# Patient Record
Sex: FEMALE | Race: WHITE
[De-identification: names, ages, dates, MRNs, and addresses within clinical notes are randomized per-mention and may not be internally consistent; named-entity substitution may affect disease eponyms.]

---

## 2017-02-14 ENCOUNTER — HOSPITAL ENCOUNTER (OUTPATIENT)
Dept: HOSPITAL 39 - ER | Age: 77
Setting detail: OBSERVATION
LOS: 1 days | Discharge: HOME | End: 2017-02-15
Attending: NURSE PRACTITIONER | Admitting: INTERNAL MEDICINE
Payer: MEDICARE

## 2017-02-14 DIAGNOSIS — Z90.710: ICD-10-CM

## 2017-02-14 DIAGNOSIS — R07.89: Primary | ICD-10-CM

## 2017-02-14 DIAGNOSIS — Z86.73: ICD-10-CM

## 2017-02-14 DIAGNOSIS — R06.02: ICD-10-CM

## 2017-02-14 DIAGNOSIS — K44.9: ICD-10-CM

## 2017-02-14 DIAGNOSIS — Z88.3: ICD-10-CM

## 2017-02-14 DIAGNOSIS — Z80.42: ICD-10-CM

## 2017-02-14 DIAGNOSIS — Z96.652: ICD-10-CM

## 2017-02-14 DIAGNOSIS — Z83.3: ICD-10-CM

## 2017-02-14 DIAGNOSIS — Z98.1: ICD-10-CM

## 2017-02-14 DIAGNOSIS — Z90.49: ICD-10-CM

## 2017-02-14 DIAGNOSIS — K76.0: ICD-10-CM

## 2017-02-14 DIAGNOSIS — K21.9: ICD-10-CM

## 2017-02-14 DIAGNOSIS — E87.6: ICD-10-CM

## 2017-02-14 DIAGNOSIS — Z88.6: ICD-10-CM

## 2017-02-14 DIAGNOSIS — Z81.8: ICD-10-CM

## 2017-02-14 DIAGNOSIS — E53.8: ICD-10-CM

## 2017-02-14 DIAGNOSIS — Z82.3: ICD-10-CM

## 2017-02-14 DIAGNOSIS — Z82.49: ICD-10-CM

## 2017-02-14 DIAGNOSIS — E11.9: ICD-10-CM

## 2017-02-14 DIAGNOSIS — G40.909: ICD-10-CM

## 2017-02-14 PROCEDURE — 82550 ASSAY OF CK (CPK): CPT

## 2017-02-14 PROCEDURE — 80053 COMPREHEN METABOLIC PANEL: CPT

## 2017-02-14 PROCEDURE — 82553 CREATINE MB FRACTION: CPT

## 2017-02-14 PROCEDURE — 80048 BASIC METABOLIC PNL TOTAL CA: CPT

## 2017-02-14 PROCEDURE — 85379 FIBRIN DEGRADATION QUANT: CPT

## 2017-02-14 PROCEDURE — 81001 URINALYSIS AUTO W/SCOPE: CPT

## 2017-02-14 PROCEDURE — 94760 N-INVAS EAR/PLS OXIMETRY 1: CPT

## 2017-02-14 PROCEDURE — 36415 COLL VENOUS BLD VENIPUNCTURE: CPT

## 2017-02-14 PROCEDURE — 93005 ELECTROCARDIOGRAM TRACING: CPT

## 2017-02-14 PROCEDURE — 71010: CPT

## 2017-02-14 PROCEDURE — 99284 EMERGENCY DEPT VISIT MOD MDM: CPT

## 2017-02-14 PROCEDURE — 83880 ASSAY OF NATRIURETIC PEPTIDE: CPT

## 2017-02-14 PROCEDURE — 80061 LIPID PANEL: CPT

## 2017-02-14 PROCEDURE — 84484 ASSAY OF TROPONIN QUANT: CPT

## 2017-02-14 PROCEDURE — 71275 CT ANGIOGRAPHY CHEST: CPT

## 2017-02-14 PROCEDURE — 85025 COMPLETE CBC W/AUTO DIFF WBC: CPT

## 2017-02-14 PROCEDURE — 85730 THROMBOPLASTIN TIME PARTIAL: CPT

## 2017-02-14 PROCEDURE — 85610 PROTHROMBIN TIME: CPT

## 2017-02-14 NOTE — RAD
PROCEDURE: XR CHEST 1 VIEW



HISTORY: pain



COMPARISON:



 9/5/2014



TECHNIQUE: 



Single projection of the chest was done.



FINDINGS:



The lung fields are well inflated .



There are no discrete airspace infiltrates, pneumothoraces or

pleural effusions.



The pulmonary vascularity is normal.



The cardiomediastinal contour is stable .



IMPRESSION: 



There is no acute pleural-parenchymal process seen in the imaged

lung fields.



Location of Interpretation: [facility]



Electronically signed by:  Dane Kearns MD  2/14/2017 11:38 PM

CST

## 2017-02-14 NOTE — ED.PDOC
History of Present Illness





- General


Stated Complaint: shortness of breath, chest pain


Time Seen by Provider: 17 23:12


Source: patient, RN notes reviewed, Vital Signs reviewed


Exam Limitations: no limitations





- History of Present Illness


Initial Comments: 


She stated she had on and off chest discomfort all day comes and goes but at 

about  2 hours ago her left shoulder felt tingling then feeiling of going to 

sleep which patient stated had it since she was in er.No diaphoresis but with 

sob,no nausea or vomiting no dizziness.Had seen cardiologist 2 years ago and 

cardiolyte scan done no major abnormalities noted.No follow up was then made 

for the last 1 1/2 yrs.


Timing/Duration: other - all day on and off


Severity: moderate


Improving Factors: nothing


Worsening Factors: nothing


Associated Symptoms: denies symptoms


Allergies/Adverse Reactions: 


Allergies





Amoxicillin Allergy (Verified 14 18:51)


 


Codeine Allergy (Verified 14 18:51)


 


Morphine Allergy (Verified 14 18:51)


 








Home Medications: 


Ambulatory Orders





ALPRAZolam [Xanax] 0.25 mg PO BID 13 


Amitriptyline HCl 75 mg PO HS 13 


Aspirin 81 mg PO BEDTIME 13 


Bupropion HCl [Wellbutrin Sr] 150 mg PO BID 13 


Calcium Carbonate-Vitamin D [Calcium 600 + D 600-400 mg-Unit] 1 tab PO BID  


Docusate Sodium [Stool Softener] 100 mg PO HS 13 


Furosemide [Lasix] 20 mg PO DAILY 13 


HYDROcodone 5MG/APAP 325MG [Norco 5/325] 5 mg PO Q4-6H PRN 13 


Methylcobalamin [B12-Active] 1 mg PO DAILY 13 


Potassium Chloride [Klor-Con 8] 8 meq PO DAILY 13 


Topiramate [Topamax] 50 mg PO BID 13 


Clopidogrel Bisulfate [Plavix] 75 mg PO BEDTIME 14 


rOPINIRole HCL [Requip] 1 mg PO TID 14 


Vegetable Laxative 2 each PO BEDTIME 03/25/15 


Gabapentin 100 mg PO BID 17 


Linaclotide [Linzess] 290 mcg PO DAILY 17 


Melatonin 5 mg PO BEDTIME 17 


Robaxin 500 mg PO Q6HRS 17 


Tramadol HCl 50 mg PO PRN 17 


raNITIdine HCL [Zantac] 150 mg PO BID 17 











Review of Systems





- Review of Systems


Constitutional: States: no symptoms reported


EENTM: States: no symptoms reported


Respiratory: States: short of breath


Cardiology: States: see HPI


Gastrointestinal/Abdominal: States: no symptoms reported


Genitourinary: States: no symptoms reported


Musculoskeletal: States: no symptoms reported, back pain - chronic-stable


Skin: States: no symptoms reported


Neurological: States: no symptoms reported


Endocrine: States: no symptoms reported


Hematologic/Lymphatic: States: no symptoms reported





Past Medical History (General)





- Patient Medical History


Hx Seizures: Yes


Hx Stroke: Yes


Hx Dementia: No


Hx Asthma: No


Hx of COPD: No


Hx Cardiac Disorders: Yes


Hx Congestive Heart Failure: No


Hx Pacemaker: No


Hx Hypertension: No


Hx Thyroid Disease: No


Hx Diabetes: Yes


Hx Gastroesophageal Reflux: Yes


Hx Renal Disease: Yes


Hx Cancer: No


Hx of HIV: No


Hx Hepatitis C: No


Hx MRSA: No


Surgical History: cholecystectomy, other - hysterectomy,back,knee,foot





- Vaccination History


Hx Tetanus, Diphtheria Vaccination:  - Unknown


Hx Influenza Vaccination: Yes


Hx Pneumococcal Vaccination: Yes





- Social History


Hx Tobacco Use: No


Hx Chewing Tobacco Use: No


Hx Alcohol Use: No


Hx Substance Use: No


Hx Substance Use Treatment: No


Hx Depression: No


Hx Physical Abuse: No


Hx Emotional Abuse: No


Hx Suspected Abuse: No





- Female History


Patient Pregnant: No





Family Medical History





- Family History


  ** Father


Living Status: 


Hx Family Hypertension: Yes


Hx Cardiac Disease: Yes


Hx Family Diabetes: Yes


Hx Family Cancer: Yes - prostate





  ** Mother


Living Status: 


Hx Family;Other: alheimzers





Physical Exam





- Physical Exam


General Appearance: Alert, No apparent distress


Ears, Nose, Throat: hearing grossly normal, normal ENT inspection, normal 

pharynx


Neck: non-tender, full range of motion, supple


Respiratory: chest non-tender, lungs clear, normal breath sounds, no 

respiratory distress


Cardiovascular/Chest: normal peripheral pulses, regular rate, rhythm, no edema, 

no gallop, no JVD, no murmur


Gastrointestinal/Abdominal: normal bowel sounds, non tender, soft, no 

organomegaly, no pulsatile mass


Back Exam: normal inspection, no CVA tenderness


Extremity: normal range of motion, non-tender, normal inspection, no pedal edema

, other - distal phalanx amputation right thumb


Neurologic: no motor/sensory deficits, oriented x 3


Skin Exam: normal color, warm/dry


Lymphatic: no adenopathy





Progress





- Results/Orders


Results/Orders: 


 





17 23:15


EKG STAT 








 Laboratory Results











WBC  6.9 K/mm3 (4.8-10.8)   17  23:15    


 


RBC  4.44 M/mm3 (4.20-5.40)   17  23:15    


 


Hgb  12.7 gm/dL (12.0-16.0)   17  23:15    


 


Hct  38.4 % (36.0-47.0)   17  23:15    


 


MCV  86.3 fl (81.0-99.0)   17  23:15    


 


MCH  28.5 pg (27.0-31.0)   17  23:15    


 


MCHC  33.1 g/dL (33.0-37.0)   17  23:15    


 


RDW  13.9 % (11.5-14.5)   17  23:15    


 


Plt Count  222 K/mm3 (130-400)   17  23:15    


 


MPV  7.4 fl (7.40-10.4)   17  23:15    


 


Absolute Neuts (auto)  4.10 K/uL (1.8-6.8)   17  23:15    


 


Absolute Lymphs (auto)  2.00 K/uL (1.0-3.4)   17  23:15    


 


Absolute Monos (auto)  0.60 K/uL (0.2-0.8)   17  23:15    


 


Absolute Eos (auto)  0.10 K/uL (0.0-0.4)   17  23:15    


 


Absolute Basos (auto)  0.00 K/uL (0.0-0.1)   17  23:15    


 


Neutrophils %  59.2 % (42.0-78.0)   17  23:15    


 


Lymphocytes %  28.9 % (20.0-50.0)   17  23:15    


 


Monocytes %  9.2 % (2.0-9.0)  H  17  23:15    


 


Eosinophils %  2.1 % (1.0-5.0)   17  23:15    


 


Basophils %  0.6 % (0.0-2.0)   17  23:15    


 


PT  10.2 SECONDS (9.4-12.5)   17  23:16    


 


INR  0.900   17  23:16    


 


PTT (SP)  33.0 SECONDS (25.1-36.5)   17  23:16    


 


D-Dimer, Quantitative  403 ng/mL (0-230)  H*  17  23:15    


 


Sodium  141 mmol/L (135-145)   17  23:15    


 


Potassium  3.4 mmol/L (3.6-5.0)  L  17  23:15    


 


Chloride  108 mmol/L (101-111)   17  23:15    


 


Carbon Dioxide  25 mmol/L (21-31)   17  23:15    


 


Anion Gap  11.4  (12-18)  L  17  23:15    


 


BUN  13 mg/dL (7-18)   17  23:15    


 


Creatinine  0.76 mg/dL (0.6-1.3)   17  23:15    


 


BUN/Creatinine Ratio  17.1  (10-20)   17  23:15    


 


Random Glucose  86 mg/dL ()   17  23:15    


 


Serum Osmolality  280.7 mOsm/L (275-295)   17  23:15    


 


Calcium  9.1 mg/dL (8.4-10.2)   17  23:15    


 


Total Bilirubin  0.4 mg/dL (0.2-1.0)   17  23:15    


 


AST  20 IU/L (10-42)   17  23:15    


 


ALT  14 IU/L (10-60)   17  23:15    


 


Alkaline Phosphatase  138 IU/L ()  H  17  23:15    


 


Creatine Kinase  107 IU/L ()   17  23:15    


 


CK-MB (CK-2)  5.2 ng/mL (0.0-4.4)  H*  17  23:15    


 


CK-MB (CK-2) %  4.86 % (0.0-4.3)  H  17  23:15    


 


Troponin I  < 0.02 ng/mL (0.01-0.05)   17  23:15    


 


B-Natriuretic Peptide  50.0 pg/ml (0-100)   17  23:15    


 


Serum Total Protein  6.9 gm/dL (6.4-8.2)   17  23:15    


 


Albumin  4.1 g/dl (3.2-5.5)   17  23:15    


 


Globulin  2.8 gm/dL (2.3-3.5)   17  23:15    


 


Albumin/Globulin Ratio  1.5  (1.1-1.9)   17  23:15    


 


Urine Color  Yellow  (Yellow)   17  23:38    


 


Urine Appearance  Clear  (Clear)   17  23:38    


 


Urine pH  7.0  (4.5-7.8)   17  23:38    


 


Ur Specific Gravity  1.015  (1.005-1.030)   17  23:38    


 


Urine Protein  Negative mg/dL  17  23:38    


 


Urine Glucose (UA)  Negative mg/dL (Negative)   17  23:38    


 


Urine Ketones  Negative mg/dL (NEGATIVE)   17  23:38    


 


Urine Blood  Negative  (Negative)   17  23:38    


 


Urine Nitrite  Negative   17  23:38    


 


Urine Bilirubin  Negative  (NEGATIVE)   17  23:38    


 


Urine Urobilinogen  0.2 mg/dL (0.2-1.0)   17  23:38    


 


Ur Leukocyte Esterase  Trace  (Negative)  H  17  23:38    


 


Urine RBC  0 /hpf  17  23:38    


 


Urine WBC  1-3 /hpf  17  23:38    


 


Ur Epithelial Cells  0 /hpf  17  23:38    


 


Urine Bacteria  Rare   17  23:38    














- EKG/XRAY/CT


XRAY: chest - no acute abnormality noted


CT Ordered: Yes - CTA-no PE noted





Departure





- Departure


Clinical Impression: 


 Chest discomfort


Time of Disposition: :22 - D/W Dr. Chambers-hospitalist for admit


Disposition: Admit Patient


Condition: Good


Home Medications: 


Ambulatory Orders





ALPRAZolam [Xanax] 0.25 mg PO BID 13 


Amitriptyline HCl 75 mg PO HS 13 


Aspirin 81 mg PO BEDTIME 13 


Bupropion HCl [Wellbutrin Sr] 150 mg PO BID 13 


Calcium Carbonate-Vitamin D [Calcium 600 + D 600-400 mg-Unit] 1 tab PO BID  


Docusate Sodium [Stool Softener] 100 mg PO HS 13 


Furosemide [Lasix] 20 mg PO DAILY 13 


HYDROcodone 5MG/APAP 325MG [Norco 5/325] 5 mg PO Q4-6H PRN 13 


Methylcobalamin [B12-Active] 1 mg PO DAILY 13 


Potassium Chloride [Klor-Con 8] 8 meq PO DAILY 13 


Topiramate [Topamax] 50 mg PO BID 13 


Clopidogrel Bisulfate [Plavix] 75 mg PO BEDTIME 14 


rOPINIRole HCL [Requip] 1 mg PO TID 14 


Vegetable Laxative 2 each PO BEDTIME 03/25/15 


Gabapentin 100 mg PO BID 17 


Linaclotide [Linzess] 290 mcg PO DAILY 17 


Melatonin 5 mg PO BEDTIME 17 


Robaxin 500 mg PO Q6HRS 17 


Tramadol HCl 50 mg PO PRN 17 


raNITIdine HCL [Zantac] 150 mg PO BID 17

## 2017-02-15 VITALS — TEMPERATURE: 97.5 F | DIASTOLIC BLOOD PRESSURE: 83 MMHG | SYSTOLIC BLOOD PRESSURE: 129 MMHG | OXYGEN SATURATION: 99 %

## 2017-02-15 RX ADMIN — SUCRALFATE SCH GM: 1 TABLET ORAL at 02:31

## 2017-02-15 RX ADMIN — SUCRALFATE SCH GM: 1 TABLET ORAL at 06:15

## 2017-02-15 RX ADMIN — SUCRALFATE SCH: 1 TABLET ORAL at 16:25

## 2017-02-15 RX ADMIN — SUCRALFATE SCH: 1 TABLET ORAL at 09:49

## 2017-02-15 RX ADMIN — SUCRALFATE SCH: 1 TABLET ORAL at 06:17

## 2017-02-15 NOTE — SSS
SUPERVISING PHYSICIAN:  Andrea Sheridan M.D.



CHIEF COMPLAINT:  Chest pain with shortness of breath.



HISTORY OF PRESENT ILLNESS:  Ms. Farah is a 76 year-old  female 
patient that presented to the Emergency Department initially on 17 
complaining of on and off chest discomfort for the last 24 hours, but noticed 2 
hours prior to admission that her left shoulder started tingling as if it was 
going to sleep.  She denied any diaphoresis but noted some shortness of breath, 
but no nausea or vomiting.  No dizziness.  Initial cardiac enzymes in the 
Emergency Department showed a  with CK-, troponin less than 0.02 
and beta natriuretic peptide of 50.  Potassium 3.4.  Liver functions showed to 
be within normal limits except for an elevated alkaline phosphatase at 138.  
Coagulation studies showed a normal PT and PTT, but an elevated D-dimer at 403.
  CBC showed to be within normal limits.  CTA of the chest was completed with 
concerns for a pulmonary embolism with the elevated D-dimer and per radiology 
interpretation there were no acute intrathoracic abnormalities.  There was note 
of a hiatal hernia with some mucosal thickening of the esophagus which could be 
secondary to reflux esophagitis.  There was no discrete filling defect within 
the pulmonary artery system to suggest a pulmonary embolism.  Initial EKG 
showed normal sinus rhythm with slight T wave inversion in V1, otherwise there 
was no ST elevation noted, compared to EKGs performed on previous E. R. visits 
on 13 and 

14.  EKGs show no acute changes when compared to old EKGs.  In the 
Emergency Department, she was given an aspirin and then admitted to the Medical/
Surgical floor for observation to further rule out any acute myocardial 
infarction or acute cardiac event.  She was admitted in stable condition.



PAST MEDICAL HISTORY: 

1.   Type 2 diabetes mellitus.

2.   Gastroesophageal reflux disease with history of esophageal strictures 
requiring

      dilation.

3.   History of fatty liver noted on CT scan in .

4.   Transient ischemic attack in .

5.   Seizure disorder secondary to transient ischemic attack in .

6.   B12 deficiency.



PAST SURGICAL HISTORY: 

1.   Appendectomy in .

2.   Cholecystectomy in .

3.   Hysterectomy in .

4.   Bladder suspension in .

5.    section times 2 in  and .

6.   Total left knee replacement in .

7.   Bilateral salpingo-oophorectomy in .

8.   Traumatic amputation of right thumb in .

9.   Bilateral laminectomy and foraminectomy of L2 to 5 with fusion.

10. Nuclear stress test in 2007 with an estimated ejection fraction of 75%

      with no perfusion defects.

11. Nuclear stress test with known perfusion and ejection fraction of 55% in

      2014.

12. Cardiac catheterization in 2008 with normal coronaries and again 

      in 2013 with nonobstructive disease of the LAD.



CURRENT MEDICATIONS: 



ALLERGIES:  AMOXICILLIN, CODEINE AND MORPHINE.



FAMILY HISTORY:  Father  at age 84 secondary to prostate cancer, 
cerebrovascular accident and myocardial infarction.  Mother  at age 87 
secondary to Alzheimer's disease with history of hypertension and diabetes 
mellitus.



SOCIAL HISTORY:  The patient is retired.  Previously worked as a  
and  at [employer].  She lives in [place].  She is .
  She has 3 children.  She has never smoked and is currently a nondrinker.



REVIEW OF SYSTEMS:  CONSTITUTIONAL: Denies any fever of unintentional weight 
loss.  HEENT: Denies any headaches, sore throat, nasal congestion.  RESPIRATORY
: As noted in the History of Present Illness.  Mild shortness of breath 
associated with current episode of chest pain prior to admission.  
CARDIOVASCULAR: As noted in the History of Present Illness.  GASTROINTESTINAL: 
Denies any abdominal pains, nausea, vomiting, diarrhea or constipation.  
MUSCULOSKELETAL: Chronic back pain.  NEUROLOGIC: She denies any syncopal 
episodes, dizziness or headaches.



PHYSICAL EXAMINATION: 



VITAL SIGNS:  Temperature 97.5, pulse 75, blood pressure 126/75, respirations 20
, O2 sat 99% on room air.  Admission weight 78.3 kg.



GENERAL:  Upon admission to the Medical/Surgical floor, the patient appeared to 
be in no acute distress.  She is resting comfortably.  Denied any chest pain.  
She is alert.



HEENT:  Tympanic membranes are clear bilaterally.  Oropharynx is pink and moist 
without any lesions.  



NECK:  No jugular venous distention.



CHEST:  Lungs are clear to auscultation without any rhonchi, wheezing or rales.



CARDIOVASCULAR:  Regular rate and rhythm without appreciable murmurs, gallops, 
or rubs.



ABDOMEN:  Normal bowel sounds, non-tender, soft.



EXTREMITIES:   No clubbing, cyanosis or edema.  There is note of a distal 
phalanx amputation of the right thumb.



NEUROLOGIC:  She is oriented times three.  Cranial nerves II-XII are grossly 
intact.  Facial features are symmetrical.  Extraocular movements are within 
normal limits.  There is no notable nystagmus.  There are no appreciated motor 
neuro deficits.



LABORATORY:  CBC was within normal limits.  White count 6.9.  Coagulation 
studies show normal PT and PTT with D-dimer elevated at 403.  Chemistries 
initially showed a low potassium at 3.4 on admission with BUN 13, creatinine 
0.76, alkaline phosphatase was elevated at 138.  Initial troponin was less than 
0.02, repeat troponins 6 hours later was less than 0.02.  Discharge 
electrolytes showed to be within normal limits with potassium 3.8, BUN and 
creatinine were stable.  Lipid panel showed triglycerides 130, cholesterol 182, 
LDL cholesterol 96, HDL was 68.  Urine dipstick showed only a trace of 
leukocyte esterase.  Microscopic revealed 0 RBCs, 1 to 3 WBCs, 0 epithelials 
and rare bacteria.  EKG normal sinus rhythm with some T wave abnormalities to 
include some T wave inversion in V1 unchanged between initial and followup EKG 
on admission, and compared to previous EKGs in 13 and 14 no acute 
changes noted.  T wave inversion was present in previous old EKGs. 



RADIOLOGY:  Chest x-ray per radiology interpretation showed no acute 
parenchymal process.  This was followed-up with a chest thoracic CTA and per 
radiology interpretation showed no acute intrathoracic abnormalities.  There 
were no discrete filling defects within the pulmonary artery system to suggest 
pulmonary emboli.  There was note of a main pulmonary artery enlarged measuring 
approximately 3.4 cm compatible with pulmonary arterial hypertension as well as 
mention of a small hiatal hernia.



ASSESSMENT: 

1.   Chest pains without any elevation of troponin or acute changes on EKG, 
etiology

      unknown possibly secondary to gastroesophageal reflux disease with a noted

      small hiatal hernia.

2.   History of gastroesophageal reflux disease requiring esophageal stricture

      dilation.

3.   Type 2 diabetes mellitus not requiring insulin.

4.   Fatty liver disease as noted on CT scan in .

5.   History of transient ischemic attacks in .



HOSPITAL COURSE:  Ms. Farah was placed into Observation.  She was continuously 
monitored on telemetry with no noted changes in her rhythm.  No abnormalities 
were reported.  She remained without any chest pains without any intervention 
prior to admission.  EKGs remained unchanged as well as cardiac enzymes every 6 
hours were less than 0.02.  Given that the patient had no recurrence of chest 
pains and was able to ambulate without any recurrence of her chest pains, it 
was felt the patient was stable and clinically well enough to be discharged to 
have close clinical followup.



PLAN:  The patient will be discharged to have close clinical followup with Dr. Quiñones as scheduled.  She will be encouraged to utilize an event monitor to 
further help evaluate for any cardiac events.  She was instructed to continue 
with all of her previous medications and was given instructions on the 
utilization of Nitro sublingual for recurrence of chest pains, and to return to 
the E. R. should her symptoms recur.  Her diet was to consist of a low sodium 
cardiac diet and low fat.  She was encouraged to increase her activities as 
tolerated, but to do no strenuous exercising until she was seen in followup 
with Dr. Quiñones.  She will need followup with Dr. Quiñones as well as Cardiology 
which will be arranged from Dr. Quiñones' office.  She was encouraged to return to 
the hospital should she have recurrence of her chest pains or call 911.  At 
discharge she was stable and condition was good.  New prescriptions at 
discharge included:



1.   Nitroglycerin sublingual 0.4 mg every 5 minutes times 3 for chest pains, 1 
bottle,

      no refills.



All other medications were continued as per prescriptions prior to admission.  
She was discharged on 02/15/17 in stable condition.



#[id number]
[facility]

## 2017-02-15 NOTE — CT
EXAM:



CT CHEST ANGIOGRAPHY WITH IV CONTRAST



HISTORY: 



pain and elevated d dimer



COMPARISON:



 None Available



TECHNIQUE: 



Contiguous axial images of the chest were obtained from the

thoracic inlet to the level of the upper abdomen after the

administration of intravenous contrast followed by reconstruction

images. Volume rendering images were performed. 



FINDINGS:



The aorta is of normal contour and tapering. There is no discrete

filling defect within the pulmonary arterial system to suggest

pulmonary emboli. There is a small amount of fluid within the

superior pericardial recess. There is mild atherosclerosis. The

main pulmonary artery is enlarged measuring approximately 3.4 cm

compatible with pulmonary arterial hypertension. There is a small

hiatal hernia. There is mucosal thickening of the esophagus.

There is no pleural fluid collection. There is no parenchymal

consolidation or pneumothorax. Limited images of the upper

abdomen are within normal limits.



IMPRESSION: 



No acute intrathoracic abnormality.



Hiatal hernia. Mucosal thickening of the esophagus could be

secondary to reflux esophagitis.



Electronically signed by:  John Mustafa MD  2/15/2017 12:54 AM

CST

## 2017-02-18 ENCOUNTER — HOSPITAL ENCOUNTER (EMERGENCY)
Dept: HOSPITAL 39 - ER | Age: 77
Discharge: HOME | End: 2017-02-18
Payer: MEDICARE

## 2017-02-18 VITALS — OXYGEN SATURATION: 98 %

## 2017-02-18 VITALS — SYSTOLIC BLOOD PRESSURE: 135 MMHG | DIASTOLIC BLOOD PRESSURE: 80 MMHG

## 2017-02-18 VITALS — TEMPERATURE: 98.2 F

## 2017-02-18 DIAGNOSIS — Z88.3: ICD-10-CM

## 2017-02-18 DIAGNOSIS — R07.9: Primary | ICD-10-CM

## 2017-02-18 DIAGNOSIS — Z79.82: ICD-10-CM

## 2017-02-18 DIAGNOSIS — N28.9: ICD-10-CM

## 2017-02-18 DIAGNOSIS — Z88.6: ICD-10-CM

## 2017-02-18 DIAGNOSIS — Z86.73: ICD-10-CM

## 2017-02-18 DIAGNOSIS — R56.9: ICD-10-CM

## 2017-02-18 DIAGNOSIS — Z79.899: ICD-10-CM

## 2017-02-18 NOTE — RAD
EXAM: Chest,1 View



CLINICAL INDICATION: 76-year-old female with chest pain.



TECHNIQUE: Single view, AP portable chest was obtained.



COMPARISON:  2/14/2017. 



FINDINGS: 



Stable cardiac and mediastinal silhouette. Heart size is normal.

Tortuous thoracic aorta.

Lungs are clear without focal opacity, pneumothorax or pleural

effusions. However, small RIGHT upper lobe and LEFT upper lobe

ill-defined nodules are identified measuring 8 mm at the level of

the RIGHT upper lobe fourth fifth rib interspace and 4 mm at the

level of the LEFT upper lobe overlying the fifth rib may reflect

pulmonary nodules versus postinfectious or postinflammatory

opacities. The visualized bones are within normal limits. 



IMPRESSION: 

1. No acute cardiopulmonary abnormalities.

2. Small round focal areas of opacification present at the level

of the bilateral lung lung apices may be secondary to artifact,

confluence of shadows versus pulmonary nodule or infectious,

inflammatory processes. Short-term interval follow-up or CT chest

may be considered considered for resolution.



Electronically signed by:  Mei Townsend MD  2/18/2017 8:13 PM CST

## 2017-02-18 NOTE — ED.PDOC
History of Present Illness





- General


Chief Complaint: Chest Pain/MI


Stated Complaint: chest pain


Time Seen by Provider: 17 19:50


Source: patient, RN notes reviewed, Vital Signs reviewed





- History of Present Illness


Initial Comments: 


She stated she was hospitalized wednesday for chest pain symptoms but today had 

same symptoms of chest discomfort with tingling sensation on left arm No sob no 

diaphoresis.She was placed on holter monitor on discharge 


Timing/Duration: 1-3 hours


Severity/Quality: moderate, tightness, other - dicomfort


Location: central


Chest Pain Radiation: arms - left


Prior Chest Pain/Cardiac Workup: cardiolye scan - 2015, echocardiography


Improving Factors: nothing


Worsening Factors: nothing


Nitro Today/Relief: 0.4 mg x 1


Aspirin Treatment Today: provided by ED


Allergies/Adverse Reactions: 


Allergies





Amoxicillin Allergy (Verified 17 19:51)


 


Codeine Allergy (Verified 17 19:51)


 


Morphine Allergy (Verified 17 19:51)


 








Home Medications: 


Ambulatory Orders





ALPRAZolam [Xanax] 0.25 mg PO BID 13 


Amitriptyline HCl 75 mg PO HS 13 


Aspirin 81 mg PO BEDTIME 13 


Bupropion HCl [Wellbutrin Sr] 150 mg PO BID 13 


Calcium Carbonate-Vitamin D [Calcium 600 + D 600-400 mg-Unit] 1 tab PO BID  


Docusate Sodium [Stool Softener] 100 mg PO HS 13 


Furosemide [Lasix] 20 mg PO DAILY 13 


HYDROcodone 5MG/APAP 325MG [Norco 5/325] 5 mg PO Q4-6H PRN 13 


Methylcobalamin [B12-Active] 1 mg PO DAILY 13 


Potassium Chloride [Klor-Con 8] 8 meq PO DAILY 13 


Topiramate [Topamax] 50 mg PO BID 13 


Clopidogrel Bisulfate [Plavix] 75 mg PO BEDTIME 14 


rOPINIRole HCL [Requip] 1 mg PO TID 14 


Vegetable Laxative 2 each PO BEDTIME 03/25/15 


Gabapentin 100 mg PO BID 17 


Linaclotide [Linzess] 290 mcg PO DAILY 17 


Melatonin 5 mg PO BEDTIME 17 


Robaxin 500 mg PO PRN PRN 17 


Tramadol HCl 50 mg PO PRN 17 


raNITIdine HCL [Zantac] 150 mg PO BID 17 


Nitroglycerin 0.4 mg Tab [Nitrostat] 1 ea SL .Q5M PRN #1 bottle 02/15/17 











Review of Systems





- Review of Systems


Constitutional: States: no symptoms reported


EENTM: States: no symptoms reported


Respiratory: States: no symptoms reported


Cardiology: States: see HPI


Gastrointestinal/Abdominal: States: no symptoms reported


Genitourinary: States: no symptoms reported


Musculoskeletal: States: no symptoms reported


Endocrine: States: no symptoms reported


Hematologic/Lymphatic: States: no symptoms reported





Past Medical History (General)





- Patient Medical History


Hx Seizures: Yes


Hx Stroke: Yes


Hx Dementia: No


Hx Asthma: No


Hx of COPD: No


Hx Cardiac Disorders: Yes


Hx Congestive Heart Failure: No


Hx Pacemaker: No


Hx Hypertension: No


Hx Thyroid Disease: No


Hx Diabetes: No


Hx Gastroesophageal Reflux: Yes


Hx Renal Disease: Yes


Hx Cancer: No


Hx of HIV: No


Hx Hepatitis C: No


Hx MRSA: No


Surgical History: cholecystectomy, other - hysterctomy,back,foot





- Vaccination History


Hx Tetanus, Diphtheria Vaccination:  - Unknown


Hx Influenza Vaccination: Yes


Hx Pneumococcal Vaccination: Yes





- Social History


Hx Tobacco Use: No


Hx Chewing Tobacco Use: No


Hx Alcohol Use: No


Hx Substance Use: No


Hx Substance Use Treatment: No


Hx Depression: No


Hx Physical Abuse: No


Hx Emotional Abuse: No


Hx Suspected Abuse: No





- Female History


Patient Pregnant: No





Family Medical History





- Family History


  ** Father


Living Status: 


Hx Family Hypertension: Yes


Hx Cardiac Disease: Yes


Hx Family Diabetes: Yes


Hx Family Cancer: Yes - prostate





  ** Mother


Living Status: 


Hx Family Asthma: No


Hx Family Congestive Heart Failure: No


Hx Family Hypertension: No


Hx Family Stroke: No


Hx Cardiac Disease: No


Hx Family Diabetes: Yes


Hx Family Cancer: Yes


Hx Family;Other: alheimzers





Physical Exam





- Physical Exam


General Appearance: Alert, Anxious, No apparent distress


Eyes, Ears, Nose, Throat Exam: normal ENT inspection, TMs normal, pharynx normal


Neck: non-tender, full range of motion, supple, normal inspection


Respiratory: chest non-tender, lungs clear, normal breath sounds, no 

respiratory distress


Cardiovascular/Chest: normal peripheral pulses, regular rate, rhythm, no edema, 

no gallop, no JVD, no murmur


Gastrointestinal/Abdominal: normal bowel sounds, non tender, soft, no 

organomegaly


Extremity: normal range of motion, non-tender, normal inspection


Neurologic: no motor/sensory deficits, alert, oriented x 3


Skin Exam: normal color, warm/dry, cyanosis


Lymphatic: no adenopathy





Progress





- Progress


Progress: 





17 21:58


She stated chest pain free;offered to be admitted here at [facility] and also 

transfer to [facility] where his cardiologist practice but she declined. 

Discuss in the presence of her family-daughter and grand daughter.





- Results/Orders


Results/Orders: 


 





17 19:52


IV Care:Saline Lock per Protoc QSHIFT 


Telemetry .ONCE 


Sodium Chloride 0.9% (Flush) [Saline Flush Syringe]   10 ml IV PRN PRN 


EKG Stat 


Pulse Ox Stat 








 Laboratory Results











WBC  6.9 K/mm3 (4.8-10.8)   17  20:05    


 


RBC  4.68 M/mm3 (4.20-5.40)   17  20:05    


 


Hgb  13.6 gm/dL (12.0-16.0)   17  20:05    


 


Hct  40.4 % (36.0-47.0)   17  20:05    


 


MCV  86.4 fl (81.0-99.0)   17  20:05    


 


MCH  29.0 pg (27.0-31.0)   17  20:05    


 


MCHC  33.7 g/dL (33.0-37.0)   17  20:05    


 


RDW  14.1 % (11.5-14.5)   17  20:05    


 


Plt Count  240 K/mm3 (130-400)   17  20:05    


 


MPV  7.3 fl (7.40-10.4)  L  17  20:05    


 


Absolute Neuts (auto)  3.70 K/uL (1.8-6.8)   17  20:05    


 


Absolute Lymphs (auto)  2.50 K/uL (1.0-3.4)   17  20:05    


 


Absolute Monos (auto)  0.60 K/uL (0.2-0.8)   17  20:05    


 


Absolute Eos (auto)  0.20 K/uL (0.0-0.4)   17  20:05    


 


Absolute Basos (auto)  0.00 K/uL (0.0-0.1)   17  20:05    


 


Neutrophils %  52.9 % (42.0-78.0)   17  20:05    


 


Lymphocytes %  35.3 % (20.0-50.0)   17  20:05    


 


Monocytes %  8.5 % (2.0-9.0)   17  20:05    


 


Eosinophils %  2.6 % (1.0-5.0)   17  20:05    


 


Basophils %  0.7 % (0.0-2.0)   17  20:05    


 


PT  10.6 SECONDS (9.4-12.5)   17  20:05    


 


INR  0.940   17  20:05    


 


PTT (SP)  31.1 SECONDS (25.1-36.5)   17  20:05    


 


Sodium  137 mmol/L (135-145)   17  20:05    


 


Potassium  3.8 mmol/L (3.6-5.0)   17  20:05    


 


Chloride  105 mmol/L (101-111)   17  20:05    


 


Carbon Dioxide  24 mmol/L (21-31)   17  20:05    


 


Anion Gap  11.8  (12-18)  L  17  20:05    


 


BUN  15 mg/dL (7-18)   17  20:05    


 


Creatinine  0.74 mg/dL (0.6-1.3)   17  20:05    


 


BUN/Creatinine Ratio  20.3  (10-20)  H  17  20:05    


 


Random Glucose  109 mg/dL ()  H  17  20:05    


 


Serum Osmolality  275.2 mOsm/L (275-295)   17  20:05    


 


Calcium  9.4 mg/dL (8.4-10.2)   17  20:05    


 


Magnesium  2.2 mg/dL (1.8-2.5)   17  20:05    


 


Creatine Kinase  54 IU/L ()   17  20:05    


 


CK-MB (CK-2)  2.5 ng/mL (0.0-4.4)   17  20:05    


 


CK-MB (CK-2) %  Not Reportable   17  20:05    


 


Troponin I  < 0.02 ng/mL (0.01-0.05)   17  20:05    


 


B-Natriuretic Peptide  8.7 pg/ml (0-100)   17  20:05    














- EKG/XRAY/CT


EKG: Sinus, nonspecific ST T wave Chg





Departure





- Departure


Clinical Impression: 


 Chest pain of unknown etiology


Time of Disposition: 22:04


Disposition: Discharge to Home or Self Care


Condition: Good


Departure Forms:  ED Discharge - Pt. Copy, Patient Portal Self Enrollment


Referrals: 


Ward Quiñones III, MD [Primary Care Provider] - 1-2 Weeks


Home Medications: 


Ambulatory Orders





ALPRAZolam [Xanax] 0.25 mg PO BID 13 


Amitriptyline HCl 75 mg PO HS 13 


Aspirin 81 mg PO BEDTIME 13 


Bupropion HCl [Wellbutrin Sr] 150 mg PO BID 13 


Calcium Carbonate-Vitamin D [Calcium 600 + D 600-400 mg-Unit] 1 tab PO BID  


Docusate Sodium [Stool Softener] 100 mg PO HS 13 


Furosemide [Lasix] 20 mg PO DAILY 13 


HYDROcodone 5MG/APAP 325MG [Norco 5/325] 5 mg PO Q4-6H PRN 13 


Methylcobalamin [B12-Active] 1 mg PO DAILY 13 


Potassium Chloride [Klor-Con 8] 8 meq PO DAILY 13 


Topiramate [Topamax] 50 mg PO BID 13 


Clopidogrel Bisulfate [Plavix] 75 mg PO BEDTIME 14 


rOPINIRole HCL [Requip] 1 mg PO TID 14 


Vegetable Laxative 2 each PO BEDTIME 03/25/15 


Gabapentin 100 mg PO BID 17 


Linaclotide [Linzess] 290 mcg PO DAILY 17 


Melatonin 5 mg PO BEDTIME 17 


Robaxin 500 mg PO PRN PRN 17 


Tramadol HCl 50 mg PO PRN 17 


raNITIdine HCL [Zantac] 150 mg PO BID 17 


Nitroglycerin 0.4 mg Tab [Nitrostat] 1 ea SL .Q5M PRN #1 bottle 02/15/17 








Additional Instructions: 


RETURN TO EMERGENCY ROOM AS NEEDED Continue with all home medications;Keep 

appointment with primary md 2017

## 2017-02-18 NOTE — CT
EXAM DESCRIPTION: 



Head



CLINICAL HISTORY: 



not feeling right  



COMPARISON: 



None Available.



TECHNIQUE: 



Contiguous axial images of the brain were obtained without the

administration of intravenous contrast.



FINDINGS: 



There is no acute intracranial hemorrhage or mass effect.

Ventricular system is within normal limits. There is adequate

gray-white matter differentiation. There is no skull fracture.

The visualized paranasal sinuses and mastoid air cells are within

normal limits.



IMPRESSION: 



No acute intracranial abnormalities.





Electronically signed by:  John Mustafa MD  2/18/2017 8:39 PM

CST

## 2017-11-02 ENCOUNTER — HOSPITAL ENCOUNTER (OUTPATIENT)
Dept: HOSPITAL 39 - LAB.NP | Age: 77
Discharge: HOME | End: 2017-11-02
Attending: FAMILY MEDICINE
Payer: MEDICARE

## 2017-11-02 DIAGNOSIS — E11.9: ICD-10-CM

## 2017-11-02 DIAGNOSIS — Z79.899: ICD-10-CM

## 2017-11-02 DIAGNOSIS — E55.9: ICD-10-CM

## 2017-11-02 DIAGNOSIS — D51.3: Primary | ICD-10-CM

## 2017-11-16 ENCOUNTER — HOSPITAL ENCOUNTER (OUTPATIENT)
Dept: HOSPITAL 39 - MAMMO | Age: 77
Discharge: HOME | End: 2017-11-16
Attending: FAMILY MEDICINE
Payer: MEDICARE

## 2017-11-16 DIAGNOSIS — Z12.31: Primary | ICD-10-CM

## 2017-11-16 PROCEDURE — 77063 BREAST TOMOSYNTHESIS BI: CPT

## 2017-11-17 NOTE — MAM
EXAM DESCRIPTION: 

3D Screening BILATERAL : Digital Mammography.



CLINICAL HISTORY: 

77 years Female SCREENING . No complaints. Remote family history

of breast cancer. Postmenopausal. Has taken HRT 5 or more years

ago..



COMPARISON: 

2-D digital screening bilateral studies 9/28/2016 and 8/25/2015..

 Report from prior examination also reviewed.



TECHNIQUE: 

Bilateral CC and MLO projection full-field images, 3-D

tomosynthesis digital mammographic technique. Also bilateral 

synthesized CC/ MLO full-field images.  CAD not utilized.



FINDINGS: 

The breast parenchymal density pattern is:  Heterogeneously dense

breast tissue, which may obscure small masses.  No skin

thickening or nipple retraction  bilateral solitary

microcalcifications. Bilateral axillary and intramammary lymph

nodes.

No focal, stellate mass or density, focal asymmetry , and no

suspicious microcalcifications bilaterally. Stable mammograms

compared to prior study, taking into account differences in

mammographic technique



IMPRESSION: 

BI-RADS CATEGORY: 2 - BENIGN FINDINGS.

FOLLOW UP: Routine digital bilateral  screening, one year

interval from  November 2017.



Written communication explaining the IMPRESSION and follow-up,

will be mailed to the patient and referring health care provider.



According to the American College of [employer], yearly mammograms

are recommended starting at age 40 and continuing as long as a

woman is in good health.  Any breast change noted on a breast

self-exam should be reported promptly to the patient's healthcare

provider.  Breast MRI is recommended for women with an

approximately 20-25% or greater lifetime risk of breast cancer,

including women with a strong family history of breast or ovarian

cancer and women who have been treated for Hodgkin's disease.



A negative mammographic report should not delay tissue diagnosis

in patients with significant clinical history or physical

findings.



Extremely dense breast tissue limits the sensitivity of digital

mammography. 



Electronically signed by:  Ander Gustafson MD  11/17/2017 12:33 PM

[facility] Workstation: [id number]

## 2018-09-12 ENCOUNTER — HOSPITAL ENCOUNTER (OUTPATIENT)
Dept: HOSPITAL 39 - GMAL | Age: 78
End: 2018-09-12
Attending: FAMILY MEDICINE
Payer: MEDICARE

## 2018-09-12 DIAGNOSIS — R53.83: Primary | ICD-10-CM

## 2019-03-19 ENCOUNTER — HOSPITAL ENCOUNTER (OUTPATIENT)
Dept: HOSPITAL 39 - GMAL | Age: 79
End: 2019-03-19
Attending: FAMILY MEDICINE
Payer: MEDICARE

## 2019-03-19 DIAGNOSIS — D51.3: Primary | ICD-10-CM

## 2019-03-19 DIAGNOSIS — E55.9: ICD-10-CM

## 2019-03-26 ENCOUNTER — HOSPITAL ENCOUNTER (OUTPATIENT)
Dept: HOSPITAL 39 - GMAL | Age: 79
End: 2019-03-26
Attending: FAMILY MEDICINE
Payer: MEDICARE

## 2019-03-26 ENCOUNTER — HOSPITAL ENCOUNTER (OUTPATIENT)
Dept: HOSPITAL 39 - MAMMO | Age: 79
End: 2019-03-26
Attending: FAMILY MEDICINE
Payer: MEDICARE

## 2019-03-26 DIAGNOSIS — Z12.31: Primary | ICD-10-CM

## 2019-03-26 DIAGNOSIS — D50.9: Primary | ICD-10-CM

## 2019-03-27 NOTE — MAM
EXAM DESCRIPTION: 

3D Screening BILATERAL : Digital Mammography.



CLINICAL HISTORY: 

78 years Female ANNUAL SCREENING . No complaints. No personal

history of breast cancer. Childbirth. Postmenopausal 51 years.

HRT 5 or more years ago..  Lifetime risk of developing breast

cancer (Tyrer-Cuzick model)(%):  2.4.



COMPARISON: 

Bilateral screening digital breast tomosynthesis 11/16/2017..    



TECHNIQUE: 

Bilateral CC and MLO projection full-field images,  digital

tomosynthesis mammographic technique.   Bilateral digital 2-D

full-field MLO images. CAD not available for tomosynthesis or 2-D

images.  



FINDINGS: 

Bilateral breast parenchymal density pattern is:  Scattered areas

of fibroglandular density.   No skin thickening or nipple

retraction.  Most dense fibroglandular tissues in the anterior

breasts bilaterally. Bilateral solitary microcalcifications.

Bilateral axillary lymph nodes.

No new focal, stellate mass or density, focal asymmetry , and no

suspicious microcalcifications bilaterally. Stable mammograms

compared to prior study.  



IMPRESSION: 

Benign exam.



BIRAD CATEGORY: 2 BENIGN FINDINGS.



RECOMMENDATIONS:

FOLLOW UP: Routine digital bilateral  mammographic screening, one

year interval from  March 2019.



Written communication explaining the IMPRESSION and follow-up,

will be mailed to the patient and referring health care provider.



According to the American College of Radiology, yearly mammograms

are recommended starting at age 40 and continuing as long as a

woman is in good health.  Any breast change noted on a breast

self-exam should be reported promptly to the patient's healthcare

provider.  Breast MRI is recommended for women with an

approximately 20-25% or greater lifetime risk of breast cancer,

including women with a strong family history of breast or ovarian

cancer and women who have been treated for Hodgkin's disease.



A negative mammographic report should not delay tissue diagnosis

in patients with significant clinical history or physical

findings.



Extremely dense breast tissue limits the sensitivity of digital

mammography. 



Electronically signed by:  Ander Gustafson MD  3/27/2019 4:17 PM CDT

Workstation: [id number]

## 2019-07-09 ENCOUNTER — HOSPITAL ENCOUNTER (OUTPATIENT)
Dept: HOSPITAL 39 - GMAL | Age: 79
End: 2019-07-09
Attending: FAMILY MEDICINE
Payer: MEDICARE

## 2019-07-09 DIAGNOSIS — D50.8: Primary | ICD-10-CM

## 2020-04-08 ENCOUNTER — HOSPITAL ENCOUNTER (EMERGENCY)
Dept: HOSPITAL 39 - ER | Age: 80
Discharge: ANOTHER HOSPITAL | End: 2020-04-08
Payer: MEDICARE

## 2020-04-08 VITALS — SYSTOLIC BLOOD PRESSURE: 134 MMHG | DIASTOLIC BLOOD PRESSURE: 66 MMHG | OXYGEN SATURATION: 100 % | TEMPERATURE: 98.8 F

## 2020-04-08 DIAGNOSIS — K92.1: ICD-10-CM

## 2020-04-08 DIAGNOSIS — D50.0: Primary | ICD-10-CM

## 2020-04-08 DIAGNOSIS — Z86.73: ICD-10-CM

## 2020-04-08 PROCEDURE — 86901 BLOOD TYPING SEROLOGIC RH(D): CPT

## 2020-04-08 PROCEDURE — 85730 THROMBOPLASTIN TIME PARTIAL: CPT

## 2020-04-08 PROCEDURE — 82150 ASSAY OF AMYLASE: CPT

## 2020-04-08 PROCEDURE — 83690 ASSAY OF LIPASE: CPT

## 2020-04-08 PROCEDURE — 80048 BASIC METABOLIC PNL TOTAL CA: CPT

## 2020-04-08 PROCEDURE — 86900 BLOOD TYPING SEROLOGIC ABO: CPT

## 2020-04-08 PROCEDURE — 86850 RBC ANTIBODY SCREEN: CPT

## 2020-04-08 PROCEDURE — 36415 COLL VENOUS BLD VENIPUNCTURE: CPT

## 2020-04-08 PROCEDURE — 85025 COMPLETE CBC W/AUTO DIFF WBC: CPT

## 2020-04-08 PROCEDURE — 85610 PROTHROMBIN TIME: CPT

## 2020-04-08 PROCEDURE — 74177 CT ABD & PELVIS W/CONTRAST: CPT

## 2020-04-08 PROCEDURE — 86922 COMPATIBILITY TEST ANTIGLOB: CPT

## 2020-04-08 NOTE — CT
EXAM DESCRIPTION: 

Abdomen/Pelvis w/Contrast: Computed Tomography.



CLINICAL HISTORY: 

79 years Female lower GI Bleeding



COMPARISON: 

Pelvis CT scan March 2015.



TECHNIQUE: 

Spiral-axial scans at 5 x 5 mm intervals through the abdomen and

pelvis, after nonionic IV contrast no oral contrast.  Coronal and

sagittal 2.0 mm reconstructions. No adverse reactions.  Total

Exam DLP: 981 mGy-cm.  This exam was performed according to our

departmental dose-optimization program which includes automated

exposure control, adjustment of the mA and/or kV according to

patient size and/or use of iterative reconstruction technique; to

reduce radiation dose to as low as reasonably achievable (ALARA).



FINDINGS: 

Lung bases and pleura: Negative.



Liver, Stomach, Spleen, Adrenal Glands: Small sliding hiatal

hernia. Distal stomach and proximal duodenum distended by gas but

no air-fluid levels. Solid organs are unremarkable.



Pancreas, Gallbladder, Ducts: Gallbladder not visualized;

pancreas and ducts negative.



Kidneys and Ureters: 2 mm and 1 mm radiodense stone inferior

collecting system right kidney with no hydronephrosis nor

hydroureter and no perirenal fluid. Cyst extending from the

posterior left renal cortex to the upper collecting system with

no radiodense stones and left ureter unremarkable.



Mesentery: No free air or free fluid. No fatty stranding or

fascial thickening.



Aorta: Minimal atherosclerotic calcification.



Small Bowel: Minimal gas and fluid distally with no distention.



Terminal Ileum/Cecum: Normal caliber with gas in the terminal

ileum. Appendix not seen. No inflammatory changes.



Colon: Fecal matter and gas proximally. Distal descending colon

and sigmoid colon distended by fecal matter. Marked redundancy of

the sigmoid colon..



Pelvic Organs: Moderate distention of rectosigmoid by fecal

matter. No free fluid. Vaginal cuff negative. No radiodense

stones in the urinary bladder.



Spine and Bony Pelvis: Upper Lumbar levoscoliosis and lumbosacral

dextroscoliosis. 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

spondylolisthesis with 4 level lateral transpedicular fusion

L2-L5 and interbody fusion L3-4 and L4-5. Foraminal narrowing at

several levels. Spondylosis lower thoracic spine. Mild SI joint

arthrosis and arthrosis pubic symphysis. Hypertrophic superior

left acetabulum narrowing of the left superior hip joint.



Abdominal Wall/Back Soft Tissues: Diastases umbilicus is stable.

Calcifications bilateral buttock adipose tissue.



IMPRESSION: 

1. Fecal impaction rectosigmoid versus moderate constipation

without complications. No free air or free fluid, and no fluid in

the cul-de-sac.

2. Nonobstructing renal stones inferior collecting system right

kidney; largest 2 mm diameter.

3. Interval placement of posterior transpedicular fusion L2-L5

with interbody fusion, and advanced scoliosis. Hardware in

customary position with no definite complications.

4. Patient is due for routine yearly screening mammography at

this facility.



Electronically signed by:  Ander Gustafson MD  4/8/2020 3:08 PM CDT

Workstation: [id number]

## 2020-04-08 NOTE — ED.PDOC
History of Present Illness





- General


Stated Complaint: weakness, low BP, blood in stool


Time Seen by Provider: 20 13:36





Past Medical History (General)





- Patient Medical History


Hx Seizures: Yes


Hx Stroke: Yes


Hx Dementia: No


Hx Asthma: No


Hx of COPD: No


Hx Cardiac Disorders: Yes


Hx Congestive Heart Failure: No


Hx Pacemaker: No


Hx Hypertension: No


Hx Thyroid Disease: No


Hx Diabetes: No


Hx Gastroesophageal Reflux: Yes


Hx Renal Disease: Yes


Hx Cancer: No


Hx of HIV: No


Hx Hepatitis C: No


Hx MRSA: No





- Vaccination History


Hx Tetanus, Diphtheria Vaccination:  - Unknown


Hx Influenza Vaccination: Yes


Hx Pneumococcal Vaccination: Yes





- Social History


Hx Tobacco Use: No


Hx Chewing Tobacco Use: No


Hx Alcohol Use: No


Hx Substance Use: No


Hx Substance Use Treatment: No


Hx Depression: No


Hx Physical Abuse: No


Hx Emotional Abuse: No


Hx Suspected Abuse: No





- Activities of Daily Living


Hospice Agency (if applicable):: None





- Female History


Patient is a Female of Child Bearing Age (10 -59 yrs old): No


Patient Pregnant: No





- Triage Comment


ED Triage Comment: pt voices blood in stool that started 2 days ago, weakness 

and low blood pressure at home





Family Medical History





- Family History


  ** Father


Living Status: 


Hx Family Hypertension: Yes


Hx Cardiac Disease: Yes


Hx Family Diabetes: Yes


Hx Family Cancer: Yes - prostate





  ** Mother


Living Status: 


Hx Family Asthma: No


Hx Family Congestive Heart Failure: No


Hx Family Hypertension: No


Hx Family Stroke: No


Hx Cardiac Disease: No


Hx Family Diabetes: Yes


Hx Family Cancer: Yes


Hx Family;Other: alheimzers





Progress





- Progress


Progress: 





20 14:07


78 y/o female with weakness becoming severe last pm.  she took her blood 

pressure and found it to be 80/43.  she has had black tarry BMs several days 

over the last week.  no fever, cough or SOB.  She takes Plavix and aspirin.  She

also has a R patella fracture. It occurred during physical therapy after a knee 

replacement.


20 14:50


discussed with Dr Iveth Peña.  She will accept pt in transfer for Direct 

admit to [place], [place]





Departure





- Departure


Clinical Impression: 


 Blood loss anemia





GI bleed


Qualifiers:


 GI bleed type/associated pathology: melena Qualified Code(s): K92.1 - Melena





Time of Disposition: 14:17


Disposition: Transfer to Hospital


Condition: Poor


Referrals: 


Ward Quiñones III, MD [Primary Care Provider] - 1-2 Weeks


Home Medications: 


Ambulatory Orders





ALPRAZolam [Xanax] 0.25 mg PO BID 13 


Amitriptyline HCl 75 mg PO HS 13 


Aspirin 81 mg PO BEDTIME 13 


Bupropion HCl [Wellbutrin Sr] 150 mg PO BID 13 


Calcium Carbonate-Vitamin D [Calcium 600 + D 600-400 mg-Unit] 1 tab PO BID 

13 


Docusate Sodium [Stool Softener] 100 mg PO HS 13 


Furosemide [Lasix] 20 mg PO DAILY 13 


HYDROcodone 5MG/APAP 325MG [Norco 5/325] 5 mg PO Q4-6H PRN 13 


Methylcobalamin [B12-Active] 1 mg PO DAILY 13 


Potassium Chloride [Klor-Con 8] 8 meq PO DAILY 13 


Topiramate [Topamax] 50 mg PO BID 13 


Clopidogrel Bisulfate [Plavix] 75 mg PO BEDTIME 14 


rOPINIRole HCL [Requip] 1 mg PO TID 14 


Vegetable Laxative 2 each PO BEDTIME 03/25/15 


Gabapentin 100 mg PO BID 17 


Linaclotide [Linzess] 290 mcg PO DAILY 17 


Melatonin 5 mg PO BEDTIME 17 


Robaxin 500 mg PO PRN PRN 17 


Tramadol HCl 50 mg PO PRN 17 


raNITIdine HCL [Zantac] 150 mg PO BID 17 


Nitroglycerin 0.4 mg Tab [Nitrostat] 1 ea SL .Q5M PRN #1 bottle 02/15/17 











Transfer to Outside Facility





- Transfer Information


Decision to Transfer Date: 20


Decision to Transfer Time: 14:18


Reason for Transfer: required specialist not available - Needs 

gastroenterologist not available at this facility

## 2020-10-06 ENCOUNTER — HOSPITAL ENCOUNTER (OUTPATIENT)
Dept: HOSPITAL 39 - GMAL | Age: 80
End: 2020-10-06
Attending: FAMILY MEDICINE
Payer: MEDICARE

## 2020-10-06 DIAGNOSIS — Z79.899: ICD-10-CM

## 2020-10-06 DIAGNOSIS — R53.83: ICD-10-CM

## 2020-10-06 DIAGNOSIS — E11.9: ICD-10-CM

## 2020-10-06 DIAGNOSIS — D64.9: Primary | ICD-10-CM

## 2021-01-05 ENCOUNTER — HOSPITAL ENCOUNTER (OUTPATIENT)
Dept: HOSPITAL 39 - GMAL | Age: 81
End: 2021-01-05
Attending: FAMILY MEDICINE
Payer: MEDICARE

## 2021-01-05 DIAGNOSIS — E11.9: ICD-10-CM

## 2021-01-05 DIAGNOSIS — E78.49: ICD-10-CM

## 2021-01-05 DIAGNOSIS — D50.8: Primary | ICD-10-CM

## 2021-01-05 DIAGNOSIS — I10: ICD-10-CM

## 2021-01-25 ENCOUNTER — HOSPITAL ENCOUNTER (OUTPATIENT)
Dept: HOSPITAL 39 - MRI | Age: 81
End: 2021-01-25
Attending: FAMILY MEDICINE
Payer: MEDICARE

## 2021-01-25 DIAGNOSIS — R90.82: ICD-10-CM

## 2021-01-25 DIAGNOSIS — R41.81: Primary | ICD-10-CM

## 2021-01-25 DIAGNOSIS — E23.6: ICD-10-CM

## 2021-01-25 DIAGNOSIS — J32.9: ICD-10-CM

## 2021-01-26 NOTE — MRI
EXAM DESCRIPTION: 

Brain w/wo Contrast: Magnetic Resonance Imaging.



CLINICAL HISTORY: 

80 years Female COGNITIVE DECLINE



COMPARISON: 

MRI scan brain with contrast May 2012.



TECHNIQUE: 

Multiplanar, high-field MRI, multiple conventional sequences,

without and with gadolinium IV  contrast. No adverse reactions.

Multiple axial diffusion sequences. 



FINDINGS: 

Bilateral multiple foci of hyperintense FLAIR and T2-weighted

signal in the  subcortical white matter of all lobes. Minimal

periventricular hyperintense signal in the occipital lobes.

Relative sparing of the bilateral temporal lobes. No hemorrhage,

no cerebral edema, no mass-effect.   No abnormal contrast

enhancement.  Focal hyperintense FLAIR and T2 signal in the

anterior superior right basal ganglia abutting the left frontal

lobe white matter tracks and anterior limb of the left internal

capsule. Normal signal right basal ganglia. Cystlike structure

abutting the inferior left basal ganglia and left external

capsule without contrast enhancement. No hemorrhage, no cerebral

edema, no mass-effect.   Normal contrast enhancement.   Normal

signal in the brainstem and cerebellar hemispheres. Normal

contrast enhancement.



Concordance of the diffusion and non-diffusion sequences with no

evidence of acute or subacute infarction. Cortical sulci,

ventricles, and other CSF spaces, and the subdural spaces are

physiologic for the patient's age.. No effacement or

displacement. No midline shift. No extra-axial hemorrhage. Normal

contrast enhancement.



Normal flow signal void in the major vessels of the [place]

[place], and the venous sinuses. IACs are symmetric bilaterally.

Minimal fluid signal in the bilateral mastoid air cells. No mass

effect in the bilateral Cerebellopontine angles. Normal contrast

enhancement.



Pituitary gland occupies only the base of the sella. Normal

contrast enhancement. Base of the cerebellar tonsils is above the

foramen magnum. Minimal mucoperiosteal thickening in the

paranasal sinuses. No air-fluid levels. The bony calvarium is

intact. Thickening of the inner table of the frontal bones

bilaterally symmetric with no abnormal enhancement or marrow

edema. Also stable.



IMPRESSION: 

1. Bilateral mostly subcortical white matter signal abnormalities

have decreased in number and size since the prior study. Minimal

periventricular white matter signal abnormalities. Most likely

related to aging and cerebral microvascular disease. No abnormal

enhancement, no mass effect, no hemorrhage, no midline shift.

2. Contrast in right effusion study with no evidence of

significant ischemia or acute or subacute infarction.

3. Chronic paranasal sinusitis has progressed since the prior

study with no acute findings.

4. 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

stable.



Electronically signed by:  Ander Gustafson MD  1/26/2021 12:43 PM

[facility] Workstation: [id number]

## 2021-02-02 NOTE — RAD
PROCEDURE: XR CHEST 1 VIEW



HISTORY: pain



COMPARISON:



 9/5/2014



TECHNIQUE: 



Single projection of the chest was done.



FINDINGS:



The lung fields are well inflated .



There are no discrete airspace infiltrates, pneumothoraces or

pleural effusions.



The pulmonary vascularity is normal.



The cardiomediastinal contour is stable .



IMPRESSION: 



There is no acute pleural-parenchymal process seen in the imaged

lung fields.



Location of Interpretation: Teleradiology



Electronically signed by:  Dane Kearns MD  2/14/2017 11:38 PM

CST Patient is resting comfortably, no needs at this time. Updated on plan of care. Will continue to monitor.

## 2021-10-15 NOTE — RAD
----- Message from [facility] JOSE ELIAS sent at 10/14/2021  4:55 PM CDT -----  Subject: Message to Provider    QUESTIONS  Information for Provider? Patient would need a refill for Lantus Injection   insurance is asking to switch over to this.   ---------------------------------------------------------------------------  --------------  CALL BACK INFO  What is the best way for the office to contact you? OK to leave message on   voicemail  Preferred Call Back Phone Number? [phone]  ---------------------------------------------------------------------------  --------------  SCRIPT ANSWERS  Relationship to Patient?  Self PROCEDURE: XR CHEST 1 VIEW



HISTORY: pain



COMPARISON:



 9/5/2014



TECHNIQUE: 



Single projection of the chest was done.



FINDINGS:



The lung fields are well inflated .



There are no discrete airspace infiltrates, pneumothoraces or

pleural effusions.



The pulmonary vascularity is normal.



The cardiomediastinal contour is stable .



IMPRESSION: 



There is no acute pleural-parenchymal process seen in the imaged

lung fields.



Location of Interpretation: Teleradiology



Electronically signed by:  Dane Kearns MD  2/14/2017 11:38 PM

CST